# Patient Record
Sex: MALE | NOT HISPANIC OR LATINO | URBAN - METROPOLITAN AREA
[De-identification: names, ages, dates, MRNs, and addresses within clinical notes are randomized per-mention and may not be internally consistent; named-entity substitution may affect disease eponyms.]

---

## 2019-08-08 ENCOUNTER — IMPORTED ENCOUNTER (OUTPATIENT)
Dept: URBAN - METROPOLITAN AREA CLINIC 1 | Facility: CLINIC | Age: 69
End: 2019-08-08

## 2019-08-08 PROBLEM — H25.813: Noted: 2019-08-08

## 2019-08-08 PROBLEM — R73.09: Noted: 2019-08-08

## 2019-08-08 PROCEDURE — 92015 DETERMINE REFRACTIVE STATE: CPT

## 2019-08-08 PROCEDURE — 92004 COMPRE OPH EXAM NEW PT 1/>: CPT

## 2019-08-08 NOTE — PATIENT DISCUSSION
1.  DM Type II (Diet Controlled) without sign of diabetic retinopathy and no blot heme on dilated retinal examination today OU No Macular Edema:  Discussed the pathophysiology of diabetes and its effect on the eye and risk of blindness. Stressed the importance of strong glucose control. Advised of importance of at least yearly dilated examinations but to contact us immediately for any problems or concerns. 2. Cataract OU: Observe for now without intervention. The patient was advised to contact us if any change or worsening of visionFinalized MRX given today. Return for an appointment in 1 year 30/glare with Dr. Kendal Badillo.

## 2020-08-12 ENCOUNTER — IMPORTED ENCOUNTER (OUTPATIENT)
Dept: URBAN - METROPOLITAN AREA CLINIC 1 | Facility: CLINIC | Age: 70
End: 2020-08-12

## 2020-08-12 PROBLEM — H25.813: Noted: 2020-08-12

## 2020-08-12 PROBLEM — E11.9: Noted: 2020-08-12

## 2020-08-12 PROCEDURE — 92014 COMPRE OPH EXAM EST PT 1/>: CPT

## 2020-08-12 NOTE — PATIENT DISCUSSION
1.  DM Type II (diet controlled) without sign of diabetic retinopathy and no blot heme on dilated retinal examination today OU No Macular Edema:  Discussed the pathophysiology of diabetes and its effect on the eye and risk of blindness. Stressed the importance of strong glucose control. Advised of importance of at least yearly dilated examinations but to contact us immediately for any problems or concerns. 2. Cataract OU: Observe for now without intervention. The patient was advised to contact us if any change or worsening of vision Patient deferred [facility] Rx today. Return for an appointment in 1 year 30/glare with Dr. Guerline Vergara.

## 2021-08-11 ENCOUNTER — IMPORTED ENCOUNTER (OUTPATIENT)
Dept: URBAN - METROPOLITAN AREA CLINIC 1 | Facility: CLINIC | Age: 71
End: 2021-08-11

## 2021-08-11 PROBLEM — H02.831: Noted: 2021-08-11

## 2021-08-11 PROBLEM — R73.09: Noted: 2021-08-11

## 2021-08-11 PROBLEM — H02.834: Noted: 2021-08-11

## 2021-08-11 PROBLEM — H25.813: Noted: 2021-08-11

## 2021-08-11 PROCEDURE — 92015 DETERMINE REFRACTIVE STATE: CPT

## 2021-08-11 PROCEDURE — 92014 COMPRE OPH EXAM EST PT 1/>: CPT

## 2021-08-11 NOTE — PATIENT DISCUSSION
1.  DM Type II (Diet Controlled) -- without sign of diabetic retinopathy and no blot heme on dilated retinal examination today OU No Macular Edema. Discussed the pathophysiology of diabetes and its effect on the eye and risk of blindness. Stressed the importance of strong glucose control. Advised of importance of at least yearly dilated examinations but to contact us immediately for any problems or concerns. 2. Cataract OU -- Observe for now without intervention. The patient was advised to contact us if any change or worsening of vision3. Dermatochalasis OU UL's -- Non-surgical at this time consider Ptosis VF/Bleph with progression. MRx for glasses given to patient. Letter to PCP. Return for an appointment in 1 year 30/Glare with Dr. Noa Miranda.

## 2022-04-02 ASSESSMENT — TONOMETRY
OD_IOP_MMHG: 14
OS_IOP_MMHG: 14
OS_IOP_MMHG: 14
OD_IOP_MMHG: 15
OS_IOP_MMHG: 15
OD_IOP_MMHG: 15

## 2022-04-02 ASSESSMENT — VISUAL ACUITY
OD_GLARE: 20/200
OD_SC: 20/40
OD_SC: J1
OS_SC: J1
OD_SC: J1+
OS_GLARE: 20/200
OD_CC: 20/250
OS_SC: 20/25
OS_SC: 20/30
OD_GLARE: 20/200
OD_SC: 20/25
OS_SC: 20/30
OS_SC: J1+
OD_GLARE: 20/150
OS_GLARE: 20/150
OS_GLARE: 20/200
OS_CC: 20/150-1
OD_SC: 20/30

## 2023-04-17 ENCOUNTER — COMPREHENSIVE EXAM (OUTPATIENT)
Dept: URBAN - METROPOLITAN AREA CLINIC 1 | Facility: CLINIC | Age: 73
End: 2023-04-17

## 2023-04-17 DIAGNOSIS — H25.813: ICD-10-CM

## 2023-04-17 DIAGNOSIS — H02.831: ICD-10-CM

## 2023-04-17 DIAGNOSIS — E11.9: ICD-10-CM

## 2023-04-17 DIAGNOSIS — H02.834: ICD-10-CM

## 2023-04-17 PROCEDURE — 92014 COMPRE OPH EXAM EST PT 1/>: CPT

## 2023-04-17 PROCEDURE — 92015 DETERMINE REFRACTIVE STATE: CPT

## 2023-04-17 ASSESSMENT — KERATOMETRY
OS_AXISANGLE2_DEGREES: 128
OD_K1POWER_DIOPTERS: 43.25
OS_AXISANGLE_DEGREES: 038
OD_K2POWER_DIOPTERS: 43.75
OD_AXISANGLE2_DEGREES: 50
OS_K1POWER_DIOPTERS: 43.00
OS_K2POWER_DIOPTERS: 43.50
OD_AXISANGLE_DEGREES: 140

## 2023-04-17 ASSESSMENT — TONOMETRY
OS_IOP_MMHG: 15
OD_IOP_MMHG: 15

## 2023-04-17 ASSESSMENT — VISUAL ACUITY
OD_CC: J1
OD_BAT: 20/150
OS_BAT: 20/150
OD_CC: 20/25
OS_CC: J1
OS_CC: 20/25-2

## 2024-04-22 ENCOUNTER — COMPREHENSIVE EXAM (OUTPATIENT)
Dept: URBAN - METROPOLITAN AREA CLINIC 1 | Facility: CLINIC | Age: 74
End: 2024-04-22

## 2024-04-22 DIAGNOSIS — E11.9: ICD-10-CM

## 2024-04-22 DIAGNOSIS — H25.813: ICD-10-CM

## 2024-04-22 DIAGNOSIS — H02.831: ICD-10-CM

## 2024-04-22 DIAGNOSIS — H02.834: ICD-10-CM

## 2024-04-22 PROCEDURE — 99214 OFFICE O/P EST MOD 30 MIN: CPT

## 2024-04-22 ASSESSMENT — VISUAL ACUITY
OD_BAT: 20/150
OS_CC: 20/30
OS_CC: J1+
OD_CC: 20/25+1
OD_CC: J1+
OS_BAT: 20/150

## 2024-04-22 ASSESSMENT — KERATOMETRY
OS_K1POWER_DIOPTERS: 43.00
OS_K2POWER_DIOPTERS: 43.50
OD_K2POWER_DIOPTERS: 43.75
OS_AXISANGLE2_DEGREES: 128
OD_AXISANGLE_DEGREES: 140
OS_AXISANGLE_DEGREES: 038
OD_K1POWER_DIOPTERS: 43.25
OD_AXISANGLE2_DEGREES: 50

## 2024-04-22 ASSESSMENT — TONOMETRY
OD_IOP_MMHG: 16
OS_IOP_MMHG: 15

## 2025-04-22 ENCOUNTER — COMPREHENSIVE EXAM (OUTPATIENT)
Age: 75
End: 2025-04-22

## 2025-04-22 DIAGNOSIS — H02.834: ICD-10-CM

## 2025-04-22 DIAGNOSIS — H43.812: ICD-10-CM

## 2025-04-22 DIAGNOSIS — H02.831: ICD-10-CM

## 2025-04-22 DIAGNOSIS — H25.813: ICD-10-CM

## 2025-04-22 DIAGNOSIS — E11.9: ICD-10-CM

## 2025-04-22 PROCEDURE — 99214 OFFICE O/P EST MOD 30 MIN: CPT

## 2025-04-22 PROCEDURE — 92134 CPTRZ OPH DX IMG PST SGM RTA: CPT

## 2025-04-22 PROCEDURE — 92015 DETERMINE REFRACTIVE STATE: CPT

## 2025-04-25 ENCOUNTER — PRE-OP/H&P (OUTPATIENT)
Age: 75
End: 2025-04-25

## 2025-04-25 VITALS
DIASTOLIC BLOOD PRESSURE: 75 MMHG | SYSTOLIC BLOOD PRESSURE: 132 MMHG | HEIGHT: 65 IN | BODY MASS INDEX: 26.33 KG/M2 | WEIGHT: 158 LBS | HEART RATE: 67 BPM

## 2025-04-25 DIAGNOSIS — H25.813: ICD-10-CM

## 2025-04-25 PROCEDURE — 92136 OPHTHALMIC BIOMETRY: CPT

## 2025-04-25 PROCEDURE — 99213 OFFICE O/P EST LOW 20 MIN: CPT

## 2025-04-25 PROCEDURE — 92025 CPTRIZED CORNEAL TOPOGRAPHY: CPT | Mod: NC

## 2025-04-30 ENCOUNTER — SURGERY/PROCEDURE (OUTPATIENT)
Age: 75
End: 2025-04-30

## 2025-04-30 DIAGNOSIS — H25.811: ICD-10-CM

## 2025-04-30 PROCEDURE — 99199PAV PROF ADVANCED VISION PACKAGE

## 2025-04-30 PROCEDURE — 66984AV REMOVE CATARACT, INSERT ADVANCED LENS

## 2025-04-30 PROCEDURE — 66999LNSR LENSAR LASER FOR CAT SX

## 2025-04-30 PROCEDURE — 65772LRI LRI DURING CAT SX

## 2025-05-01 ENCOUNTER — POST-OP (OUTPATIENT)
Age: 75
End: 2025-05-01

## 2025-05-01 DIAGNOSIS — Z96.1: ICD-10-CM

## 2025-05-06 ENCOUNTER — POST OP/EVAL OF SECOND EYE (OUTPATIENT)
Age: 75
End: 2025-05-06

## 2025-05-06 VITALS
BODY MASS INDEX: 26.33 KG/M2 | HEART RATE: 67 BPM | HEIGHT: 65 IN | WEIGHT: 158 LBS | DIASTOLIC BLOOD PRESSURE: 75 MMHG | SYSTOLIC BLOOD PRESSURE: 132 MMHG

## 2025-05-06 DIAGNOSIS — H25.812: ICD-10-CM

## 2025-05-06 DIAGNOSIS — Z96.1: ICD-10-CM

## 2025-05-06 PROCEDURE — 92136 OPHTHALMIC BIOMETRY: CPT | Mod: 26

## 2025-05-06 PROCEDURE — 92025 CPTRIZED CORNEAL TOPOGRAPHY: CPT | Mod: NC

## 2025-05-14 ENCOUNTER — SURGERY/PROCEDURE (OUTPATIENT)
Age: 75
End: 2025-05-14

## 2025-05-14 DIAGNOSIS — H25.812: ICD-10-CM

## 2025-05-14 PROCEDURE — 65772LRI LRI DURING CAT SX

## 2025-05-14 PROCEDURE — 66999LNSR LENSAR LASER FOR CAT SX

## 2025-05-14 PROCEDURE — 99199PAV PROF ADVANCED VISION PACKAGE

## 2025-05-14 PROCEDURE — 66984AV REMOVE CATARACT, INSERT ADVANCED LENS: Mod: 79,LT

## 2025-05-15 ENCOUNTER — POST-OP (OUTPATIENT)
Age: 75
End: 2025-05-15

## 2025-05-15 DIAGNOSIS — Z96.1: ICD-10-CM

## 2025-06-27 ENCOUNTER — POST-OP (OUTPATIENT)
Age: 75
End: 2025-06-27

## 2025-06-27 DIAGNOSIS — Z96.1: ICD-10-CM
